# Patient Record
Sex: FEMALE | Race: WHITE | NOT HISPANIC OR LATINO | Employment: FULL TIME | URBAN - METROPOLITAN AREA
[De-identification: names, ages, dates, MRNs, and addresses within clinical notes are randomized per-mention and may not be internally consistent; named-entity substitution may affect disease eponyms.]

---

## 2017-01-19 ENCOUNTER — RESULTS ENCOUNTER (OUTPATIENT)
Dept: ENDOCRINOLOGY | Age: 32
End: 2017-01-19

## 2017-01-19 DIAGNOSIS — E88.81 DYSMETABOLIC SYNDROME X: ICD-10-CM

## 2017-01-19 DIAGNOSIS — E55.9 VITAMIN D DEFICIENCY: ICD-10-CM

## 2018-02-09 ENCOUNTER — LAB (OUTPATIENT)
Dept: ENDOCRINOLOGY | Age: 33
End: 2018-02-09

## 2018-02-09 DIAGNOSIS — E55.9 VITAMIN D DEFICIENCY: Primary | ICD-10-CM

## 2018-02-09 DIAGNOSIS — E88.81 DYSMETABOLIC SYNDROME X: ICD-10-CM

## 2018-02-09 DIAGNOSIS — E55.9 VITAMIN D DEFICIENCY: ICD-10-CM

## 2018-02-14 ENCOUNTER — OFFICE VISIT (OUTPATIENT)
Dept: ENDOCRINOLOGY | Age: 33
End: 2018-02-14

## 2018-02-14 VITALS
SYSTOLIC BLOOD PRESSURE: 128 MMHG | HEIGHT: 67 IN | BODY MASS INDEX: 41.59 KG/M2 | DIASTOLIC BLOOD PRESSURE: 82 MMHG | WEIGHT: 265 LBS

## 2018-02-14 DIAGNOSIS — I10 ESSENTIAL HYPERTENSION: ICD-10-CM

## 2018-02-14 DIAGNOSIS — E88.81 INSULIN RESISTANCE: ICD-10-CM

## 2018-02-14 DIAGNOSIS — E88.81 DYSMETABOLIC SYNDROME X: Primary | ICD-10-CM

## 2018-02-14 DIAGNOSIS — IMO0001 CLASS 3 OBESITY DUE TO EXCESS CALORIES WITHOUT SERIOUS COMORBIDITY WITH BODY MASS INDEX (BMI) OF 40.0 TO 44.9 IN ADULT: ICD-10-CM

## 2018-02-14 DIAGNOSIS — E55.9 VITAMIN D DEFICIENCY: ICD-10-CM

## 2018-02-14 DIAGNOSIS — R63.5 WEIGHT GAIN: ICD-10-CM

## 2018-02-14 PROBLEM — E88.819 INSULIN RESISTANCE: Status: ACTIVE | Noted: 2018-02-14

## 2018-02-14 LAB
25(OH)D3+25(OH)D2 SERPL-MCNC: 27.2 NG/ML (ref 30–100)
ALBUMIN SERPL-MCNC: 4.4 G/DL (ref 3.5–5.2)
ALBUMIN/GLOB SERPL: 1.5 G/DL
ALP SERPL-CCNC: 68 U/L (ref 39–117)
ALT SERPL-CCNC: 21 U/L (ref 1–33)
AST SERPL-CCNC: 14 U/L (ref 1–32)
BILIRUB SERPL-MCNC: 0.7 MG/DL (ref 0.1–1.2)
BUN SERPL-MCNC: 18 MG/DL (ref 6–20)
BUN/CREAT SERPL: 22.5 (ref 7–25)
C PEPTIDE SERPL-MCNC: 2.3 NG/ML (ref 1.1–4.4)
CALCIUM SERPL-MCNC: 9.7 MG/DL (ref 8.6–10.5)
CHLORIDE SERPL-SCNC: 103 MMOL/L (ref 98–107)
CHOLEST SERPL-MCNC: 175 MG/DL (ref 0–200)
CO2 SERPL-SCNC: 23 MMOL/L (ref 22–29)
CREAT SERPL-MCNC: 0.8 MG/DL (ref 0.57–1)
ESTRADIOL SERPL-MCNC: 12.7 PG/ML
FSH SERPL-ACNC: 5.4 MIU/ML
FT4I SERPL CALC-MCNC: 2.7 (ref 1.2–4.9)
GFR SERPLBLD CREATININE-BSD FMLA CKD-EPI: 101 ML/MIN/1.73
GFR SERPLBLD CREATININE-BSD FMLA CKD-EPI: 83 ML/MIN/1.73
GLOBULIN SER CALC-MCNC: 2.9 GM/DL
GLUCOSE SERPL-MCNC: 95 MG/DL (ref 65–99)
HBA1C MFR BLD: 5.37 % (ref 4.8–5.6)
HDLC SERPL-MCNC: 55 MG/DL (ref 40–60)
INTERPRETATION: NORMAL
LDLC SERPL CALC-MCNC: 99 MG/DL (ref 0–100)
LH SERPL-ACNC: 4.5 MIU/ML
POTASSIUM SERPL-SCNC: 4.2 MMOL/L (ref 3.5–5.2)
PROLACTIN SERPL-MCNC: 3.4 NG/ML (ref 4.8–23.3)
PROT SERPL-MCNC: 7.3 G/DL (ref 6–8.5)
SODIUM SERPL-SCNC: 140 MMOL/L (ref 136–145)
T3FREE SERPL-MCNC: 2.9 PG/ML (ref 2–4.4)
T3RU NFR SERPL: 29 % (ref 24–39)
T4 FREE SERPL-MCNC: 1.28 NG/DL (ref 0.93–1.7)
T4 SERPL-MCNC: 9.2 UG/DL (ref 4.5–12)
THYROGLOB AB SERPL-ACNC: 14.1 IU/ML (ref 0–0.9)
THYROGLOB SERPL-MCNC: 2.3 NG/ML
TRIGL SERPL-MCNC: 103 MG/DL (ref 0–150)
TSH SERPL DL<=0.005 MIU/L-ACNC: 0.74 UIU/ML (ref 0.45–4.5)
VLDLC SERPL CALC-MCNC: 20.6 MG/DL (ref 5–40)

## 2018-02-14 PROCEDURE — 99214 OFFICE O/P EST MOD 30 MIN: CPT | Performed by: NURSE PRACTITIONER

## 2018-02-14 RX ORDER — METOPROLOL SUCCINATE 100 MG/1
100 TABLET, EXTENDED RELEASE ORAL DAILY
Qty: 90 TABLET | Refills: 1 | Status: SHIPPED | OUTPATIENT
Start: 2018-02-14

## 2018-02-14 RX ORDER — ERGOCALCIFEROL 1.25 MG/1
50000 CAPSULE ORAL WEEKLY
Qty: 12 CAPSULE | Refills: 1 | Status: SHIPPED | OUTPATIENT
Start: 2018-02-14 | End: 2019-01-27

## 2018-02-14 RX ORDER — DAPAGLIFLOZIN 5 MG/1
5 TABLET, FILM COATED ORAL DAILY
Qty: 30 TABLET | Refills: 3 | Status: SHIPPED | OUTPATIENT
Start: 2018-02-14 | End: 2018-02-14 | Stop reason: SDUPTHER

## 2018-02-14 RX ORDER — PHENTERMINE HYDROCHLORIDE 37.5 MG/1
37.5 CAPSULE ORAL EVERY MORNING
Qty: 30 CAPSULE | Refills: 4 | Status: SHIPPED | OUTPATIENT
Start: 2018-02-14 | End: 2019-01-27

## 2018-02-14 RX ORDER — DAPAGLIFLOZIN 5 MG/1
5 TABLET, FILM COATED ORAL DAILY
Qty: 30 TABLET | Refills: 3 | Status: SHIPPED | OUTPATIENT
Start: 2018-02-14 | End: 2019-01-27

## 2018-02-14 RX ORDER — ERGOCALCIFEROL 1.25 MG/1
50000 CAPSULE ORAL WEEKLY
Qty: 12 CAPSULE | Refills: 3 | Status: SHIPPED | OUTPATIENT
Start: 2018-02-14 | End: 2018-02-14 | Stop reason: SDUPTHER

## 2018-02-14 NOTE — PATIENT INSTRUCTIONS
Start phenteramine 37.5 mg one day- start with 1/2 tab daily    Start Victoza-0.6 injection daily    Start farxiga  5 mg once in the morning

## 2018-02-14 NOTE — PROGRESS NOTES
Deion Srivastava is a 32 y.o. female is here today for follow-up.    HPI Comments: PCOS    Abnormal Lab   This is a chronic problem. The current episode started 1 to 4 weeks ago. The problem occurs constantly. The problem has been resolved. Associated symptoms include fatigue. Pertinent negatives include no headaches, myalgias or rash. Nothing aggravates the symptoms. Treatments tried: medications and labs. The treatment provided significant relief.       The following portions of the patient's history were reviewed and updated as appropriate: current medications, past family history, past medical history, past social history, past surgical history and problem list.       Current Outpatient Prescriptions:   •  dicyclomine (BENTYL) 20 MG tablet, Take 20 mg by mouth As Needed., Disp: , Rfl:   •  metoprolol succinate XL (TOPROL XL) 100 MG 24 hr tablet, Take 1 tablet by mouth Daily., Disp: 90 tablet, Rfl: 1  •  Prenatal-FeCbn-FeAspGl-FA-Omeg (ULTIMATECARE ONE PO), Take  by mouth., Disp: , Rfl:   •  sertraline (ZOLOFT) 50 MG tablet, Take 50 mg by mouth Daily., Disp: , Rfl:   •  vitamin D (ERGOCALCIFEROL) 86734 units capsule capsule, Take 1 capsule by mouth 1 (One) Time Per Week., Disp: 12 capsule, Rfl: 3  •  FARXIGA 5 MG tablet tablet, Take 1 tablet by mouth Daily. Take 1 by mouth daily in the morning, Disp: 30 tablet, Rfl: 3  •  Liraglutide (VICTOZA) 18 MG/3ML solution pen-injector injection, Victoza 1.8mg SC daily - start .6mg SC daily x 1 week, then 1.2mg SC daily x1 week then maintain at 1.8 mg SC daily, Disp: 3 pen, Rfl: 4  •  phentermine 37.5 MG capsule, Take 1 capsule by mouth Every Morning., Disp: 30 capsule, Rfl: 4     Patient Active Problem List   Diagnosis   • Dysmetabolic syndrome X   • Infertility counseling   • Essential hypertension   • Infertility, anovulation   • Vitamin D deficiency   • Weight gain   • Class 3 obesity due to excess calories without serious comorbidity with body mass index (BMI)  of 40.0 to 44.9 in adult   • Insulin resistance       Review of Systems   Constitutional: Positive for fatigue.   HENT: Negative for trouble swallowing.    Eyes: Negative for visual disturbance.   Respiratory: Negative for choking.    Cardiovascular: Negative for palpitations.   Gastrointestinal: Negative for diarrhea.   Endocrine: Negative for cold intolerance.   Genitourinary: Positive for menstrual problem.   Musculoskeletal: Negative for myalgias.   Skin: Negative for rash.   Allergic/Immunologic: Negative.    Neurological: Negative for headaches.   Hematological: Does not bruise/bleed easily.   Psychiatric/Behavioral: The patient is nervous/anxious.    All other systems reviewed and are negative.      Objective   Physical Exam   Constitutional: She is oriented to person, place, and time. Vital signs are normal. She appears well-developed and well-nourished. She is sleeping, active and cooperative.   HENT:   Head: Atraumatic.   Eyes: EOM are normal.   Neck: Normal range of motion. Neck supple. No tracheal tenderness present. Carotid bruit is not present. No tracheal deviation present. No thyroid mass and no thyromegaly present.   Cardiovascular: Normal rate, regular rhythm, S1 normal, S2 normal and normal heart sounds.  Exam reveals no gallop.    No murmur heard.  Pulmonary/Chest: Effort normal and breath sounds normal. No accessory muscle usage. No respiratory distress.   Abdominal: Soft. Normal appearance and bowel sounds are normal. There is no tenderness.   Neurological: She is alert and oriented to person, place, and time. She has normal strength and normal reflexes. Gait normal.   Skin: Skin is warm, dry and intact. There is pallor.   Psychiatric: She has a normal mood and affect. Her speech is normal and behavior is normal. Judgment and thought content normal. Her mood appears not anxious. Her affect is not blunt. Cognition and memory are normal. She does not exhibit a depressed mood. She is attentive.    Nursing note and vitals reviewed.    Lab on 02/09/2018   Component Date Value Ref Range Status   • Total Cholesterol 02/09/2018 175  0 - 200 mg/dL Final   • Triglycerides 02/09/2018 103  0 - 150 mg/dL Final   • HDL Cholesterol 02/09/2018 55  40 - 60 mg/dL Final   • VLDL Cholesterol 02/09/2018 20.6  5 - 40 mg/dL Final   • LDL Cholesterol  02/09/2018 99  0 - 100 mg/dL Final   • FSH 02/09/2018 5.4  mIU/mL Final    Comment:                     Adult Female:                        Follicular phase      3.5 -  12.5                        Ovulation phase       4.7 -  21.5                        Luteal phase          1.7 -   7.7                        Postmenopausal       25.8 - 134.8     • LH 02/09/2018 4.5  mIU/mL Final    Comment:                     Adult Female:                        Follicular phase      2.4 -  12.6                        Ovulation phase      14.0 -  95.6                        Luteal phase          1.0 -  11.4                        Postmenopausal        7.7 -  58.5     • Prolactin 02/09/2018 3.4* 4.8 - 23.3 ng/mL Final   • Estradiol 02/09/2018 12.7  pg/mL Final    Comment:                     Adult Female:                        Follicular phase   12.5 -   166.0                        Ovulation phase    85.8 -   498.0                        Luteal phase       43.8 -   211.0                        Postmenopausal     <6.0 -    54.7                      Pregnancy                        1st trimester     215.0 - >4300.0                      Girls (1-10 years)    6.0 -    27.0  Roche ECLIA methodology     • Glucose 02/09/2018 95  65 - 99 mg/dL Final   • BUN 02/09/2018 18  6 - 20 mg/dL Final   • Creatinine 02/09/2018 0.80  0.57 - 1.00 mg/dL Final   • eGFR Non  Am 02/09/2018 83  >60 mL/min/1.73 Final   • eGFR African Am 02/09/2018 101  >60 mL/min/1.73 Final   • BUN/Creatinine Ratio 02/09/2018 22.5  7.0 - 25.0 Final   • Sodium 02/09/2018 140  136 - 145 mmol/L Final   • Potassium 02/09/2018 4.2   3.5 - 5.2 mmol/L Final   • Chloride 02/09/2018 103  98 - 107 mmol/L Final   • Total CO2 02/09/2018 23.0  22.0 - 29.0 mmol/L Final   • Calcium 02/09/2018 9.7  8.6 - 10.5 mg/dL Final   • Total Protein 02/09/2018 7.3  6.0 - 8.5 g/dL Final   • Albumin 02/09/2018 4.40  3.50 - 5.20 g/dL Final   • Globulin 02/09/2018 2.9  gm/dL Final   • A/G Ratio 02/09/2018 1.5  g/dL Final   • Total Bilirubin 02/09/2018 0.7  0.1 - 1.2 mg/dL Final   • Alkaline Phosphatase 02/09/2018 68  39 - 117 U/L Final   • AST (SGOT) 02/09/2018 14  1 - 32 U/L Final   • ALT (SGPT) 02/09/2018 21  1 - 33 U/L Final   • C-Peptide 02/09/2018 2.3  1.1 - 4.4 ng/mL Final    C-Peptide reference interval is for fasting patients.   • Hemoglobin A1C 02/09/2018 5.37  4.80 - 5.60 % Final    Comment: Hemoglobin A1C Ranges:  Increased Risk for Diabetes  5.7% to 6.4%  Diabetes                     >= 6.5%  Diabetic Goal                < 7.0%     • Free T4 02/09/2018 1.28  0.93 - 1.70 ng/dL Final   • 25 Hydroxy, Vitamin D 02/09/2018 27.2* 30.0 - 100.0 ng/mL Final    Comment: Reference Range for Total Vitamin D 25(OH)  Deficiency    <20.0 ng/mL  Insufficiency 21-29 ng/mL  Sufficiency    ng/mL  Toxicity      >100 ng/ml        • T3, Free 02/09/2018 2.9  2.0 - 4.4 pg/mL Final   • TSH 02/09/2018 0.736  0.450 - 4.500 uIU/mL Final   • T4, Total 02/09/2018 9.2  4.5 - 12.0 ug/dL Final   • T3 Uptake 02/09/2018 29  24 - 39 % Final   • Free Thyroxine Index 02/09/2018 2.7  1.2 - 4.9 Final   • Thyroglobulin (TG-NADER) 02/09/2018 2.3  ng/mL Final    Comment: Reference Range:  Pubertal Children  and Adults: <40  According to the National Academy of Clinical Biochemistry,  the reference interval for Thyroglobulin (TG) should be  related to euthyroid patients and not for patients who  underwent thyroidectomy.  TG reference intervals for these  patients depend on the residual mass of the thyroid tissue  left after surgery.  Establishing a post-operative baseline  is recommended.  The  assay quantitation limit is 2.0 ng/mL.     • Thyroglobulin Ab 02/09/2018 14.1* 0.0 - 0.9 IU/mL Final    Thyroglobulin Antibody measured by [facility]   • Interpretation 02/09/2018 Note   Final    Supplemental report is available.       Wt Readings from Last 3 Encounters:   02/14/18 120 kg (265 lb)   10/07/16 118 kg (260 lb)   10/03/16 118 kg (260 lb)     Temp Readings from Last 3 Encounters:   No data found for Temp     BP Readings from Last 3 Encounters:   02/14/18 128/82   10/03/16 132/88     Pulse Readings from Last 3 Encounters:   10/03/16 69   is for her over-the-counter or    Assessment/Plan 2 weeks  Sarita was seen today for follow-up.    Diagnoses and all orders for this visit:    Dysmetabolic syndrome X  -     FARXIGA 5 MG tablet tablet; Take 1 tablet by mouth Daily. Take 1 by mouth daily in the morning  -     Liraglutide (VICTOZA) 18 MG/3ML solution pen-injector injection; Victoza 1.8mg SC daily - start .6mg SC daily x 1 week, then 1.2mg SC daily x1 week then maintain at 1.8 mg SC daily  -     phentermine 37.5 MG capsule; Take 1 capsule by mouth Every Morning.    Vitamin D deficiency  -     vitamin D (ERGOCALCIFEROL) 70977 units capsule capsule; Take 1 capsule by mouth 1 (One) Time Per Week.    Essential hypertension  -     metoprolol succinate XL (TOPROL XL) 100 MG 24 hr tablet; Take 1 tablet by mouth Daily.    Weight gain  -     FARXIGA 5 MG tablet tablet; Take 1 tablet by mouth Daily. Take 1 by mouth daily in the morning  -     Liraglutide (VICTOZA) 18 MG/3ML solution pen-injector injection; Victoza 1.8mg SC daily - start .6mg SC daily x 1 week, then 1.2mg SC daily x1 week then maintain at 1.8 mg SC daily  -     phentermine 37.5 MG capsule; Take 1 capsule by mouth Every Morning.    Class 3 obesity due to excess calories without serious comorbidity with body mass index (BMI) of 40.0 to 44.9 in adult  -     FARXIGA 5 MG tablet tablet; Take 1 tablet by mouth Daily. Take 1 by mouth daily in  the morning  -     Liraglutide (VICTOZA) 18 MG/3ML solution pen-injector injection; Victoza 1.8mg SC daily - start .6mg SC daily x 1 week, then 1.2mg SC daily x1 week then maintain at 1.8 mg SC daily  -     phentermine 37.5 MG capsule; Take 1 capsule by mouth Every Morning.    Insulin resistance  -     FARXIGA 5 MG tablet tablet; Take 1 tablet by mouth Daily. Take 1 by mouth daily in the morning  -     Liraglutide (VICTOZA) 18 MG/3ML solution pen-injector injection; Victoza 1.8mg SC daily - start .6mg SC daily x 1 week, then 1.2mg SC daily x1 week then maintain at 1.8 mg SC daily  -     phentermine 37.5 MG capsule; Take 1 capsule by mouth Every Morning.            In Summary:I met with patient that is metabolic stable and doing well. She is 6 months post partum.  Which time infertility and treatment with Clomid was initiated in October.  She is unable to lose weight has been exercising and reports insulin resistance in the past.  As noted in her chart.  She is unable to take Trulicity once weekly due to side effect profile with gastrointestinal.  Reviewed labs taken prior to the appointment at this time the medication changes will be as follows    Farxiga 5 mg once a day    Victoza start as indicated titrate as instructed    Phentermine 37.5 mg        Return in about 3 months (around 5/14/2018), or if symptoms worsen or fail to improve, for Recheck. 3 months- with Renee - labs 1 week prior

## 2019-01-27 ENCOUNTER — OFFICE VISIT (OUTPATIENT)
Dept: RETAIL CLINIC | Facility: CLINIC | Age: 34
End: 2019-01-27

## 2019-01-27 VITALS
DIASTOLIC BLOOD PRESSURE: 77 MMHG | RESPIRATION RATE: 18 BRPM | OXYGEN SATURATION: 99 % | TEMPERATURE: 98.3 F | HEART RATE: 111 BPM | SYSTOLIC BLOOD PRESSURE: 120 MMHG

## 2019-01-27 DIAGNOSIS — J11.1 INFLUENZA: Primary | ICD-10-CM

## 2019-01-27 LAB
EXPIRATION DATE: NORMAL
FLUAV AG NPH QL: NEGATIVE
FLUBV AG NPH QL: NEGATIVE
INTERNAL CONTROL: NORMAL
Lab: NORMAL

## 2019-01-27 PROCEDURE — 99213 OFFICE O/P EST LOW 20 MIN: CPT | Performed by: NURSE PRACTITIONER

## 2019-01-27 PROCEDURE — 87804 INFLUENZA ASSAY W/OPTIC: CPT | Performed by: NURSE PRACTITIONER

## 2019-01-27 RX ORDER — MVN52/IRON/IRON ASP/FOLIC/OM3 27-1-330MG
1 CAPSULE ORAL DAILY
COMMUNITY
Start: 2014-09-30 | End: 2023-01-11

## 2019-01-27 RX ORDER — ERGOCALCIFEROL 1.25 MG/1
CAPSULE ORAL
COMMUNITY
Start: 2018-07-27

## 2019-01-27 RX ORDER — OSELTAMIVIR PHOSPHATE 75 MG/1
75 CAPSULE ORAL 2 TIMES DAILY
Qty: 10 CAPSULE | Refills: 0 | Status: SHIPPED | OUTPATIENT
Start: 2019-01-27 | End: 2019-02-01

## 2019-01-27 NOTE — PROGRESS NOTES
Subjective:     Sarita Srivastava is a 33 y.o.     Fever    Chronicity: pregnant. The current episode started yesterday. The maximum temperature noted was 100 to 100.9 F. Associated symptoms include coughing (mild), headaches, muscle aches and nausea. Pertinent negatives include no ear pain or wheezing. Sore throat: itchy. She has tried acetaminophen for the symptoms.         The following portions of the patient's history were reviewed and updated as appropriate: allergies, current medications, past family history, past medical history, past social history, past surgical history and problem list.      Review of Systems   Constitutional: Positive for fever.   HENT: Positive for rhinorrhea. Negative for ear pain. Sore throat: itchy.    Respiratory: Positive for cough (mild) and shortness of breath. Negative for wheezing.    Gastrointestinal: Positive for nausea.   Musculoskeletal: Positive for myalgias.   Neurological: Positive for headaches.         Objective:      Physical Exam   Constitutional: She appears well-nourished.   Eyes: Right eye exhibits no discharge. Left eye exhibits no discharge. Right conjunctiva is injected. Left conjunctiva is injected.   Cardiovascular: Tachycardia present.   Pulmonary/Chest: Effort normal.   Lymphadenopathy:     She has cervical adenopathy (mild).   Vitals reviewed.          Sarita was seen today for sinusitis.    Diagnoses and all orders for this visit:    Influenza  -     POC Influenza A / B    Other orders  -     oseltamivir (TAMIFLU) 75 MG capsule; Take 1 capsule by mouth 2 (Two) Times a Day for 5 days.    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

## 2019-03-15 DIAGNOSIS — I10 ESSENTIAL HYPERTENSION: ICD-10-CM

## 2019-03-15 RX ORDER — METOPROLOL SUCCINATE 100 MG/1
TABLET, EXTENDED RELEASE ORAL
Qty: 90 TABLET | Refills: 1 | OUTPATIENT
Start: 2019-03-15

## 2021-04-16 ENCOUNTER — BULK ORDERING (OUTPATIENT)
Dept: CASE MANAGEMENT | Facility: OTHER | Age: 36
End: 2021-04-16

## 2021-04-16 DIAGNOSIS — Z23 IMMUNIZATION DUE: ICD-10-CM

## 2023-01-11 ENCOUNTER — TELEPHONE (OUTPATIENT)
Dept: OBSTETRICS AND GYNECOLOGY | Facility: CLINIC | Age: 38
End: 2023-01-11

## 2023-01-11 ENCOUNTER — OFFICE VISIT (OUTPATIENT)
Dept: OBSTETRICS AND GYNECOLOGY | Facility: CLINIC | Age: 38
End: 2023-01-11
Payer: COMMERCIAL

## 2023-01-11 VITALS
WEIGHT: 216 LBS | HEART RATE: 81 BPM | HEIGHT: 67 IN | BODY MASS INDEX: 33.9 KG/M2 | DIASTOLIC BLOOD PRESSURE: 86 MMHG | SYSTOLIC BLOOD PRESSURE: 134 MMHG

## 2023-01-11 DIAGNOSIS — Z01.419 ENCOUNTER FOR GYNECOLOGICAL EXAMINATION WITHOUT ABNORMAL FINDING: Primary | ICD-10-CM

## 2023-01-11 DIAGNOSIS — Z80.41 FAMILY HISTORY OF OVARIAN CANCER: ICD-10-CM

## 2023-01-11 DIAGNOSIS — R10.2 PELVIC PAIN: ICD-10-CM

## 2023-01-11 PROCEDURE — 99385 PREV VISIT NEW AGE 18-39: CPT | Performed by: OBSTETRICS & GYNECOLOGY

## 2023-01-11 RX ORDER — DICYCLOMINE HYDROCHLORIDE 10 MG/1
10 CAPSULE ORAL
COMMUNITY

## 2023-01-11 RX ORDER — TIZANIDINE HYDROCHLORIDE 4 MG/1
1 CAPSULE, GELATIN COATED ORAL
COMMUNITY

## 2023-01-11 RX ORDER — DIPHENOXYLATE HYDROCHLORIDE AND ATROPINE SULFATE 2.5; .025 MG/1; MG/1
1 TABLET ORAL DAILY
COMMUNITY

## 2023-01-11 NOTE — PROGRESS NOTES
GYN Annual Exam     CC- Here for annual exam.     Sarita Srivastava is a 37 y.o. female who presents for annual well woman exam. Periods are every 32 days, lasting 4 days. Dysmenorrhea:moderate, occurring first 1-2 days of flow. Cyclic symptoms include back pain. . No intermenstrual bleeding, spotting, or discharge.  Pt c/o Nabothian cyst on L cervix and also has bumps that come and go on the labia. She notices these more after intercourse or masturbation. Bumps do drain. She also has back pain on L side, but denies any issues with urination.     OB History        4    Para   4    Term   4            AB        Living   4       SAB        IAB        Ectopic        Molar        Multiple        Live Births   4                Current contraception: none  History of abnormal Pap smear: yes - LEEP in the past  Family history of uterine, colon or ovarian cancer: yes - Mother - ovarian, grandmother colon   History of abnormal mammogram: no  Family history of breast cancer: yes - Grandmother and aunt   Last Pap : 10/07/2021 NIL HPV neg     Past Medical History:   Diagnosis Date   • Abnormal Pap smear of cervix    • Back pain    • BRCA negative    • Hypertension    • IBS (irritable bowel syndrome)    • PCOS (polycystic ovarian syndrome)        Past Surgical History:   Procedure Laterality Date   • BILATERAL BREAST REDUCTION     • BREAST RECONSTRUCTION     • CERVICAL BIOPSY  W/ LOOP ELECTRODE EXCISION     • CERVIX LESION DESTRUCTION     •  SECTION      ×2   • GASTRIC SLEEVE LAPAROSCOPIC     • TONSILLECTOMY AND ADENOIDECTOMY           Current Outpatient Medications:   •  dicyclomine (BENTYL) 10 MG capsule, Take 10 mg by mouth 4 (Four) Times a Day Before Meals & at Bedtime., Disp: , Rfl:   •  metoprolol succinate XL (TOPROL XL) 100 MG 24 hr tablet, Take 1 tablet by mouth Daily., Disp: 90 tablet, Rfl: 1  •  multivitamin tablet tablet, Take 1 tablet by mouth Daily., Disp: , Rfl:   •  TiZANidine (ZANAFLEX) 4  "MG capsule, Take 1 capsule by mouth every night at bedtime., Disp: , Rfl:   •  vitamin D (ERGOCALCIFEROL) 85365 units capsule capsule, FOR DIRECTIONS ON HOW TO   TAKE THIS MEDICINE, READ   THE ENCLOSED MEDICATION    INFORMATION FORM, Disp: , Rfl:     Allergies   Allergen Reactions   • Bacitracin Other (See Comments) and Rash     Blisters with freq use     • Augmentin [Amoxicillin-Pot Clavulanate]    • Morphine And Related    • Azithromycin Nausea And Vomiting     WITH IV INFUSION ONLY   • Povidone Iodine Rash       Social History     Tobacco Use   • Smoking status: Former     Packs/day: 0.25     Years: 5.00     Pack years: 1.25     Types: Cigarettes     Quit date: 10/3/2008     Years since quittin.2   • Smokeless tobacco: Never   Substance Use Topics   • Alcohol use: Yes     Comment: occasional   • Drug use: Not Currently     Types: Marijuana     Comment: Past hx       Family History   Problem Relation Age of Onset   • Testicular cancer Father 54   • Asthma Mother    • Hypertension Mother    • Ovarian cancer Mother 30   • Lung cancer Paternal Grandmother    • Hypertension Paternal Grandmother    • Breast cancer Maternal Grandmother    • Colon cancer Maternal Grandmother    • Kidney disease Maternal Grandfather    • Heart attack Maternal Grandfather    • Diabetes Maternal Grandfather    • Breast cancer Maternal Aunt    • Uterine cancer Neg Hx    • Deep vein thrombosis Neg Hx    • Pulmonary embolism Neg Hx        Review of Systems   Constitutional: Negative for chills, fever and unexpected weight loss.   Gastrointestinal: Negative for abdominal pain.   Genitourinary: Negative for dysuria, pelvic pain, vaginal bleeding and vaginal discharge.   All other systems reviewed and are negative.      /86   Pulse 81   Ht 170.2 cm (67\")   Wt 98 kg (216 lb)   LMP 2023 (Exact Date)   BMI 33.83 kg/m²     Physical Exam  Constitutional:       General: She is not in acute distress.     Appearance: She is " well-developed. She is obese.   Genitourinary:      Vulva normal.      Right Labia: No lesions, skin changes or Bartholin's cyst.     Left Labia: No lesions, skin changes or Bartholin's cyst.     No inguinal adenopathy present in the right or left side.     No vaginal discharge or bleeding.      Anterior vaginal prolapse present.       Right Adnexa: not tender, not full and no mass present.     Left Adnexa: not tender, not full and no mass present.     No cervical motion tenderness, friability or lesion (Possilbe small nabothian cyst at 9:00 ).      Uterus is enlarged (10-12 wks) and prolapsed (very mild).      Uterus is not tender.      No uterine mass detected.     Uterus is anteverted.   Breasts:     Right: No inverted nipple, mass or nipple discharge.      Left: No inverted nipple, mass or nipple discharge.   HENT:      Head: Normocephalic and atraumatic.      Nose: Nose normal.   Eyes:      Conjunctiva/sclera: Conjunctivae normal.      Pupils: Pupils are equal, round, and reactive to light.   Neck:      Thyroid: No thyromegaly.   Cardiovascular:      Rate and Rhythm: Normal rate and regular rhythm.      Heart sounds: Normal heart sounds. No murmur heard.  Pulmonary:      Effort: Pulmonary effort is normal. No respiratory distress.      Breath sounds: Normal breath sounds.   Abdominal:      General: Abdomen is flat. There is no distension.      Palpations: Abdomen is soft.      Tenderness: There is no abdominal tenderness.   Musculoskeletal:         General: No deformity. Normal range of motion.      Cervical back: Normal range of motion and neck supple.      Right lower leg: No edema.      Left lower leg: No edema.   Lymphadenopathy:      Lower Body: No right inguinal adenopathy. No left inguinal adenopathy.   Neurological:      Mental Status: She is alert and oriented to person, place, and time.   Skin:     General: Skin is warm and dry.      Findings: No erythema.   Psychiatric:         Behavior: Behavior  normal.         Thought Content: Thought content normal.         Judgment: Judgment normal.   Vitals reviewed. Exam conducted with a chaperone present.               Assessment     Diagnoses and all orders for this visit:    1. Encounter for gynecological examination without abnormal finding (Primary)    2. Family history of ovarian cancer    3. Pelvic pain  -     Urine Culture - , Urine, Clean Catch  -     US Non-ob Transvaginal    1) GYN exam   Pap up to date  Breast screening (see below) up to date  Contraception - wants to avoid estrogen for migraine with aura   Could consider Mirena IUD     2) Mother with ovarian, grandmother with breast and aunt with breast  She was BRCA negative (as were her sisters)   Discussed if wants to do High risk clinic down the road     3) Other concerns  Intermittent vulvar lesions. Not specific to hidradenitis, HSV, molluscum, warts?  Folliculitis or other local irritation?  Nabothian on cervix - generally do not treat   Pelvic/back pain - check urine culture and ultrasound   Describes heavy periods     Plan     1) Breast Health - Clinical breast exam yearly, Discussed American cancer society recommendations for breast cancer screening, and Self breast awareness monthly  2) Pap - Up to date   3) Smoking status- non-smoker   4) Encouraged between 7-8 hours of good sleep per night.   5) Follow up prn and one year.       Claude Lancaster MD   1/11/2023  10:07 EST

## 2023-01-11 NOTE — TELEPHONE ENCOUNTER
Rona,     Ultrasound today for pain   Overall uterus is mildly enlarged with small to moderate 2-3 cm fibroid   Nothing concerning for long term health   Could make periods heavy, painful   If get bad, follow up to review treatment options     Thanks,   Dr. Lancaster

## 2023-01-16 LAB
BACTERIA UR CULT: ABNORMAL
BACTERIA UR CULT: ABNORMAL
OTHER ANTIBIOTIC SUSC ISLT: ABNORMAL

## 2023-01-17 ENCOUNTER — TELEPHONE (OUTPATIENT)
Dept: OBSTETRICS AND GYNECOLOGY | Facility: CLINIC | Age: 38
End: 2023-01-17
Payer: COMMERCIAL

## 2023-01-17 RX ORDER — NITROFURANTOIN 25; 75 MG/1; MG/1
100 CAPSULE ORAL 2 TIMES DAILY
Qty: 14 CAPSULE | Refills: 0 | Status: SHIPPED | OUTPATIENT
Start: 2023-01-17 | End: 2023-01-24

## 2023-01-17 NOTE — TELEPHONE ENCOUNTER
----- Message from Claude Lancaster MD sent at 1/17/2023  7:38 AM EST -----  Rona, urine culture does have UTI. Sending Rx to treat. Please let her know. Thanks, Dr. Lancaster

## 2023-03-30 ENCOUNTER — TELEPHONE (OUTPATIENT)
Dept: OBSTETRICS AND GYNECOLOGY | Facility: CLINIC | Age: 38
End: 2023-03-30
Payer: COMMERCIAL

## 2023-03-30 RX ORDER — DROSPIRENONE AND ETHINYL ESTRADIOL 0.02-3(28)
1 KIT ORAL DAILY
Qty: 84 TABLET | Refills: 3 | Status: SHIPPED | OUTPATIENT
Start: 2023-03-30 | End: 2024-03-29

## 2023-03-30 NOTE — TELEPHONE ENCOUNTER
----- Message from Anne Gentile RN sent at 3/30/2023 10:57 AM EDT -----  Regarding: Birth Control  Contact: [phone]  My Chart. AE & US(pelvic pain) 1/11/2023. Sarita has decided against the IUD and would like an OCP that will be ok to take with her migraines, whichever one you think is best. [facility] pharmacy on file. Thank you.      ----- Message -----  From: Sarita Srivastava  Sent: 3/29/2023   5:22 PM EDT  To: [facility]  Subject: Birth Control                                    I don’t think I want the IUD that we discussed. Could we just start with a pill? One that is ok to take with my migraines. Can you send it to my pharmacy, whichever one you think will be best. Thanks.   Sarita

## 2023-03-30 NOTE — TELEPHONE ENCOUNTER
Anne,     Rx sent for birth control     Start Thursday- Sunday of next cycle  One pill daily   Common side effects - breast tenderness and nausea, typically resolve in 2-3 months.   Life threatening side effects _ stop for symptoms of stroke or clot in leg or alf  Efficiency - less effective first month, when skip pills or on antibiotics    Thanks,   Dr. Lancaster

## 2023-03-31 NOTE — TELEPHONE ENCOUNTER
Left message for Sarita that Dr Lancaster sent in OCP Anjelica and left the long name to your [facility] at [address] [facility] in [place], [place]. Dr Lancaster said to start sometime between Thursday - Sunday of next cycle and take 1 pill daily. Common side effects are breast tenderness and nausea, typically resolves in 2 - 3 months, as well as some bleeding between periods. Life threatening side effect - stop for symptoms of stroke or clot in leg or lung. Efficiency - less effective first month, also if you skip a pill or on an antibiotic. Try to take the OCP around the same time every day. If on an antibiotic, please use a back up method, such as condoms.

## 2023-10-26 ENCOUNTER — OFFICE VISIT (OUTPATIENT)
Dept: OBSTETRICS AND GYNECOLOGY | Facility: CLINIC | Age: 38
End: 2023-10-26
Payer: COMMERCIAL

## 2023-10-26 VITALS
HEIGHT: 67 IN | SYSTOLIC BLOOD PRESSURE: 129 MMHG | BODY MASS INDEX: 30.29 KG/M2 | DIASTOLIC BLOOD PRESSURE: 86 MMHG | WEIGHT: 193 LBS

## 2023-10-26 DIAGNOSIS — N63.0 BREAST MASS IN FEMALE: Primary | ICD-10-CM

## 2023-10-26 DIAGNOSIS — N64.4 BREAST PAIN: ICD-10-CM

## 2023-10-26 DIAGNOSIS — R19.09 ABDOMINAL MASS OF OTHER SITE: ICD-10-CM

## 2023-10-26 NOTE — PROGRESS NOTES
"Chief Complaint   Patient presents with    Breast Problem     Patient os here today c/o left breast lump      SUBJECTIVE:     Sarita Srivastava is a 38 y.o.  who presents with left breast pain and left breast mass X4 days. She feels the pain is improved, however the mass is unchanged. Describes as the size of an almond. She has additionally noticed a mobile, tender mass near her right hip. Denies any ill contacts or recent vaccinations. Prior bilateral breast reduction and reconstruction in . Reports normal mammogram approx 2 years ago. Denies nipple drainage.  No recent fevers, chills, N&V. Family hx breast cancer: MGM and maternal aunt. This is a new problem. LMP 10/7/23    She is a patient of Dr. Moncadas.  This is my first time meeting Sarita Srivastava    Past Medical History:   Diagnosis Date    Abnormal Pap smear of cervix 2006    Back pain     BRCA negative     Hypertension     IBS (irritable bowel syndrome)     PCOS (polycystic ovarian syndrome)       Past Surgical History:   Procedure Laterality Date    BILATERAL BREAST REDUCTION      BREAST RECONSTRUCTION      CERVICAL BIOPSY  W/ LOOP ELECTRODE EXCISION      CERVIX LESION DESTRUCTION       SECTION      ×2    GASTRIC SLEEVE LAPAROSCOPIC  2020    TONSILLECTOMY AND ADENOIDECTOMY          Review of Systems   Constitutional:  Negative for chills, fatigue and fever.   Gastrointestinal:  Negative for abdominal distention and abdominal pain.   Genitourinary:         + left breast mass  + left breast pain  - nipple drainage         OBJECTIVE:   Vitals:    10/26/23 1438   BP: 129/86   Weight: 87.5 kg (193 lb)   Height: 170.2 cm (67.01\")        Physical Exam  Constitutional:       General: She is not in acute distress.     Appearance: Normal appearance. She is not ill-appearing, toxic-appearing or diaphoretic.   Genitourinary:   Breasts:     Breasts are symmetrical.      Right: Present. No swelling, bleeding, inverted nipple, mass, nipple discharge, skin " change, tenderness or breast implant.      Left: Present. Mass (approx 3x3cm mobile tender mass at 3:00 position approx 8 cm from nipple) and tenderness present. No swelling, bleeding, inverted nipple, nipple discharge, skin change or breast implant.   Cardiovascular:      Rate and Rhythm: Normal rate.   Pulmonary:      Effort: Pulmonary effort is normal.   Musculoskeletal:         General: Normal range of motion.      Cervical back: Normal range of motion.   Lymphadenopathy:      Upper Body:      Right upper body: No supraclavicular or axillary adenopathy.      Left upper body: No supraclavicular or axillary adenopathy.   Neurological:      General: No focal deficit present.      Mental Status: She is alert and oriented to person, place, and time.      Cranial Nerves: No cranial nerve deficit.   Skin:     General: Skin is warm and dry.      Comments: Approx 3x4cm mobile tender mass on left side of abdomen   Psychiatric:         Mood and Affect: Mood normal.         Behavior: Behavior normal.         Thought Content: Thought content normal.         Judgment: Judgment normal.   Vitals and nursing note reviewed.         Assessment/Plan    Diagnoses and all orders for this visit:    1. Breast mass in female (Primary)  -     US Breast Left Limited; Future  -     Mammo Diagnostic Digital Tomosynthesis Bilateral With CAD; Future    2. Breast pain  -     US Breast Left Limited; Future  -     Mammo Diagnostic Digital Tomosynthesis Bilateral With CAD; Future    3. Abdominal mass of other site    Breast pain is improving, but still present  There is an approx 3x3cm mobile tender mass at 3:00 position approx 8 cm from nipple  Will complete bilateral diagnostic mammogram and left breast US to further evaluate   Advised to f/u with PCP regarding abdominal mass     Return if symptoms worsen or fail to improve.    I spent 25 minutes caring for Sarita on this date of service. This time includes time spent by me in the following  activities: preparing for the visit, reviewing tests, obtaining and/or reviewing a separately obtained history, performing a medically appropriate examination and/or evaluation, counseling and educating the patient/family/caregiver, ordering medications, tests, or procedures, referring and communicating with other health care professionals, and documenting information in the medical record    JAVIER Mijares  10/26/2023  15:02 EDT

## 2023-10-27 ENCOUNTER — TELEPHONE (OUTPATIENT)
Dept: OBSTETRICS AND GYNECOLOGY | Facility: CLINIC | Age: 38
End: 2023-10-27
Payer: COMMERCIAL

## 2023-10-27 NOTE — TELEPHONE ENCOUNTER
Pt came over to office and we scheduled her appt at [facility] on 11/16/23 @ 830 & 9am with a 8am arrival.  Pt is scheduled for DX Bilat Mammo & Left Limited US.

## 2023-11-16 DIAGNOSIS — N64.4 BREAST PAIN: ICD-10-CM

## 2023-11-16 DIAGNOSIS — N63.0 BREAST MASS IN FEMALE: ICD-10-CM

## 2024-02-19 RX ORDER — DROSPIRENONE AND ETHINYL ESTRADIOL 0.02-3(28)
1 KIT ORAL DAILY
Qty: 84 TABLET | Refills: 0 | Status: SHIPPED | OUTPATIENT
Start: 2024-02-19 | End: 2024-02-20 | Stop reason: SDUPTHER

## 2024-02-20 ENCOUNTER — OFFICE VISIT (OUTPATIENT)
Dept: OBSTETRICS AND GYNECOLOGY | Facility: CLINIC | Age: 39
End: 2024-02-20
Payer: COMMERCIAL

## 2024-02-20 VITALS
WEIGHT: 185 LBS | DIASTOLIC BLOOD PRESSURE: 92 MMHG | BODY MASS INDEX: 29.03 KG/M2 | SYSTOLIC BLOOD PRESSURE: 131 MMHG | HEART RATE: 89 BPM | HEIGHT: 67 IN

## 2024-02-20 DIAGNOSIS — N89.8 VAGINAL ODOR: ICD-10-CM

## 2024-02-20 DIAGNOSIS — Z30.41 ENCOUNTER FOR SURVEILLANCE OF CONTRACEPTIVE PILLS: ICD-10-CM

## 2024-02-20 DIAGNOSIS — Z01.419 ENCOUNTER FOR GYNECOLOGICAL EXAMINATION WITHOUT ABNORMAL FINDING: Primary | ICD-10-CM

## 2024-02-20 RX ORDER — DROSPIRENONE AND ETHINYL ESTRADIOL 0.02-3(28)
1 KIT ORAL DAILY
Qty: 84 TABLET | Refills: 3 | Status: SHIPPED | OUTPATIENT
Start: 2024-02-20 | End: 2025-02-19

## 2024-02-20 NOTE — PROGRESS NOTES
GYN Annual Exam     CC- Here for annual exam.     Sarita Srivastava is a 38 y.o. female who presents for annual well woman exam. Periods are regular every 28-30 days, lasting 1 days. Dysmenorrhea:none. Cyclic symptoms include none. No intermenstrual bleeding, spotting, or discharge.  Pt c/o vaginal odor. Denies any d/c, burning or itching.     OB History          4    Para   4    Term   4            AB        Living   4         SAB        IAB        Ectopic        Molar        Multiple        Live Births   4                Current contraception: OCP (estrogen/progesterone)  History of abnormal Pap smear: yes - LEEP in the past   Family history of uterine, colon or ovarian cancer: yes - Ovarian Mother, Colon in Grandmother   History of abnormal mammogram: no  Family history of breast cancer: yes - Grandmother and Aunt   Last Pap : 10/03/2016 NIL HPV neg     Past Medical History:   Diagnosis Date    Abnormal Pap smear of cervix     Back pain     BRCA negative     Hypertension     IBS (irritable bowel syndrome)     PCOS (polycystic ovarian syndrome)        Past Surgical History:   Procedure Laterality Date    BILATERAL BREAST REDUCTION      BREAST RECONSTRUCTION      CERVICAL BIOPSY  W/ LOOP ELECTRODE EXCISION      CERVIX LESION DESTRUCTION       SECTION      ×2    GASTRIC SLEEVE LAPAROSCOPIC  2020    TONSILLECTOMY AND ADENOIDECTOMY           Current Outpatient Medications:     drospirenone-ethinyl estradiol (ALEC,GIANVI) 3-0.02 MG per tablet, Take 1 tablet by mouth Daily., Disp: 84 tablet, Rfl: 3    dicyclomine (BENTYL) 10 MG capsule, Take 1 capsule by mouth 4 (Four) Times a Day Before Meals & at Bedtime., Disp: , Rfl:     TiZANidine (ZANAFLEX) 4 MG capsule, Take 1 capsule by mouth every night at bedtime., Disp: , Rfl:     Allergies   Allergen Reactions    Bacitracin Other (See Comments) and Rash     Blisters with freq use      Augmentin [Amoxicillin-Pot Clavulanate]     Morphine And Related      "Azithromycin Nausea And Vomiting     WITH IV INFUSION ONLY    Povidone Iodine Rash       Social History     Tobacco Use    Smoking status: Former     Packs/day: 0.25     Years: 5.00     Additional pack years: 0.00     Total pack years: 1.25     Types: Cigarettes     Quit date: 10/3/2008     Years since quitting: 15.3    Smokeless tobacco: Never   Vaping Use    Vaping Use: Never used   Substance Use Topics    Alcohol use: Yes     Comment: occasional    Drug use: Not Currently     Types: Marijuana     Comment: Past hx       Family History   Problem Relation Age of Onset    Testicular cancer Father 54    Asthma Mother     Hypertension Mother     Ovarian cancer Mother 30    Lung cancer Paternal Grandmother     Hypertension Paternal Grandmother     Breast cancer Maternal Grandmother     Colon cancer Maternal Grandmother     Kidney disease Maternal Grandfather     Heart attack Maternal Grandfather     Diabetes Maternal Grandfather     Breast cancer Maternal Aunt     Uterine cancer Neg Hx     Deep vein thrombosis Neg Hx     Pulmonary embolism Neg Hx        Review of Systems   Constitutional:  Negative for chills and fever.   Gastrointestinal:  Negative for abdominal pain, constipation and diarrhea.   Genitourinary:  Negative for menstrual problem, pelvic pain, vaginal bleeding and vaginal discharge.   All other systems reviewed and are negative.      /92   Pulse 89   Ht 170.2 cm (67\")   Wt 83.9 kg (185 lb)   LMP 02/18/2024 (Exact Date)   BMI 28.98 kg/m²     Physical Exam  Constitutional:       General: She is not in acute distress.     Appearance: She is well-developed and normal weight.   Genitourinary:      Vulva normal.      Right Labia: No lesions or Bartholin's cyst.     Left Labia: No lesions or Bartholin's cyst.     No inguinal adenopathy present in the right or left side.     No vaginal discharge or bleeding.        Right Adnexa: not tender, not full and no mass present.     Left Adnexa: not tender, not " full and no mass present.     No cervical motion tenderness or friability.      Uterus is enlarged (mildly enlarged and irregular).      Uterus is not tender.      No uterine mass detected.     Uterus is anteverted.   Breasts:     Right: No inverted nipple, mass or nipple discharge.      Left: No inverted nipple, mass or nipple discharge.   HENT:      Head: Normocephalic and atraumatic.      Nose: Nose normal.   Eyes:      Conjunctiva/sclera: Conjunctivae normal.      Pupils: Pupils are equal, round, and reactive to light.   Neck:      Thyroid: No thyromegaly.   Cardiovascular:      Rate and Rhythm: Normal rate and regular rhythm.      Heart sounds: Normal heart sounds. No murmur heard.  Pulmonary:      Effort: Pulmonary effort is normal. No respiratory distress.      Breath sounds: Normal breath sounds.   Abdominal:      General: Abdomen is flat. There is no distension.      Palpations: Abdomen is soft.      Tenderness: There is no abdominal tenderness.   Musculoskeletal:         General: No deformity. Normal range of motion.      Cervical back: Normal range of motion and neck supple.      Right lower leg: No edema.      Left lower leg: No edema.   Lymphadenopathy:      Lower Body: No right inguinal adenopathy. No left inguinal adenopathy.   Neurological:      Mental Status: She is alert and oriented to person, place, and time.   Skin:     General: Skin is warm and dry.      Findings: No erythema.   Psychiatric:         Behavior: Behavior normal.         Thought Content: Thought content normal.         Judgment: Judgment normal.   Vitals reviewed. Exam conducted with a chaperone present.               Assessment     Diagnoses and all orders for this visit:    1. Encounter for gynecological examination without abnormal finding (Primary)  -     IGP, Apt HPV,rfx 16 / 18,45    2. Encounter for surveillance of contraceptive pills    3. Vaginal odor  -     NuSwab VG+ - , Cervix    Other orders  -     drospirenone-ethinyl  estradiol (ALEC,GIAZUL) 3-0.02 MG per tablet; Take 1 tablet by mouth Daily.  Dispense: 84 tablet; Refill: 3    1) GYN exam   Pap updated, expectations reviewed.   Family hx of cancer. BRCA negative. Normal ultrasound recently and normal MMG recently     2) Treating AUB - small fibroid with OCP   Working well, to continue     3) Vaginal odor   Check NuSwab, treat per results.      Plan     1) Breast Health - Clinical breast exam yearly, Discussed [employer] cancer society recommendations for breast cancer screening, and Self breast awareness monthly  CBE normal - had MMG and ultrasound for breast pain in November and they were normal.   2) Pap - updated today   3) Smoking status- non-smoker   4) Encouraged between 7-8 hours of good sleep per night.   5) Follow up prn and one year.       Claude Lancaster MD   2/20/2024  14:21 EST

## 2024-02-22 ENCOUNTER — TELEPHONE (OUTPATIENT)
Dept: OBSTETRICS AND GYNECOLOGY | Facility: CLINIC | Age: 39
End: 2024-02-22
Payer: COMMERCIAL

## 2024-02-22 LAB
A VAGINAE DNA VAG QL NAA+PROBE: ABNORMAL SCORE
BVAB2 DNA VAG QL NAA+PROBE: ABNORMAL SCORE
C ALBICANS DNA VAG QL NAA+PROBE: POSITIVE
C GLABRATA DNA VAG QL NAA+PROBE: NEGATIVE
C TRACH DNA VAG QL NAA+PROBE: NEGATIVE
MEGA1 DNA VAG QL NAA+PROBE: ABNORMAL SCORE
N GONORRHOEA DNA VAG QL NAA+PROBE: NEGATIVE
T VAGINALIS DNA VAG QL NAA+PROBE: NEGATIVE

## 2024-02-22 RX ORDER — FLUCONAZOLE 150 MG/1
150 TABLET ORAL ONCE
Qty: 1 TABLET | Refills: 0 | Status: SHIPPED | OUTPATIENT
Start: 2024-02-22 | End: 2024-02-22

## 2024-02-22 NOTE — TELEPHONE ENCOUNTER
----- Message from Rona Krishnan MA sent at 2/22/2024 11:35 AM EST -----  L/m for pt/guerline  ----- Message -----  From: Claude Lancaster MD  Sent: 2/22/2024   9:54 AM EST  To: LILIAN Cespedes, she has yesat on culture, Rx sent to the pharmacy. Please let her know. Thanks, Dr. Lancaster

## 2024-02-23 LAB
CYTOLOGIST CVX/VAG CYTO: NORMAL
CYTOLOGY CVX/VAG DOC CYTO: NORMAL
CYTOLOGY CVX/VAG DOC THIN PREP: NORMAL
DX ICD CODE: NORMAL
HIV 1 & 2 AB SER-IMP: NORMAL
HPV I/H RISK 4 DNA CVX QL PROBE+SIG AMP: NEGATIVE
OTHER STN SPEC: NORMAL
STAT OF ADQ CVX/VAG CYTO-IMP: NORMAL

## 2024-05-20 RX ORDER — ETHINYL ESTRADIOL/DROSPIRENONE 0.02-3(28)
1 TABLET ORAL DAILY
Qty: 84 TABLET | Refills: 3 | OUTPATIENT
Start: 2024-05-20